# Patient Record
Sex: MALE | Race: WHITE | NOT HISPANIC OR LATINO | Employment: OTHER | URBAN - METROPOLITAN AREA
[De-identification: names, ages, dates, MRNs, and addresses within clinical notes are randomized per-mention and may not be internally consistent; named-entity substitution may affect disease eponyms.]

---

## 2017-02-22 ENCOUNTER — TELEPHONE (OUTPATIENT)
Dept: CARDIOLOGY | Facility: CLINIC | Age: 62
End: 2017-02-22

## 2017-02-22 NOTE — TELEPHONE ENCOUNTER
02/22/17  11:08 AM  Eduin Pisano  1955    Home Phone [phone]     Mrs. Pisano calls about her . She states last night, his HR went up to 140 for about an hour then came back down to 80s before he went to bed. She cannot remember his BP, but states it was fine.     This morning, HR has been fluctuating up to 132. She states he has no chest pain. He is has some mild SOA and occasional lightheadedness. She states he takes no medications.     He was last seen by Dr. Lezama in 2015. At that time he was taking 12.5mg metoprolol BID.    Do you want to order any testing on this patient? You have appts available the week of 3/6. Nettie has openings tomorrow if patient needs to be seen.    Mary ALTMAN RN

## 2017-02-22 NOTE — TELEPHONE ENCOUNTER
So does he want to be seen in clinic?  If so go ahead and schedule him as below.   He can also start 12.5 mg twice a day metoprolol tartrate since he is not on it

## 2017-02-22 NOTE — TELEPHONE ENCOUNTER
02/22/17  2:37 PM  Ms. Pisano accepted an appt with Nettie Avalos tomorrow, 2/23 at 2:30. I also sent 25mg metoprolol tartrate and instructed her to have her  take 1/2 tablet twice a day. I advised that her  should only take the medication if SBP >100mmHG and HR >60.    Mary ALTMAN RN

## 2017-02-22 NOTE — TELEPHONE ENCOUNTER
02/22/17  2:05 PM    I tried to call Mr. Pisano to speak with him; there was no answer. I called his wife; she asked me not to call Mr. Pisano's phone as he does not feel well. She states his HR is still fluctuating; his most recent HR 67.     She states his BP was normal for him last night. She will have him check it again when she gets home from work.    I inquired about the metoprolol. Mr. Pisano is not taking any metoprolol. His wife says he was told to stop taking it after his nuclear stress test on 5/14/15.     Mary ALTMAN RN

## 2017-02-22 NOTE — TELEPHONE ENCOUNTER
He can increase his metoprolol tartrate 25 mg twice a day.  He needs to make sure his systolic is around 100 mmHg or greater first.  If his heart rate continues to do this then we can have him seen later this week by Nettie.  If it calms down he can wait to see me the week of 3/6

## 2017-02-23 ENCOUNTER — OFFICE VISIT (OUTPATIENT)
Dept: CARDIOLOGY | Facility: CLINIC | Age: 62
End: 2017-02-23

## 2017-02-23 VITALS
BODY MASS INDEX: 28.86 KG/M2 | WEIGHT: 237 LBS | DIASTOLIC BLOOD PRESSURE: 68 MMHG | HEART RATE: 61 BPM | HEIGHT: 76 IN | SYSTOLIC BLOOD PRESSURE: 110 MMHG

## 2017-02-23 DIAGNOSIS — E78.2 MIXED HYPERLIPIDEMIA: ICD-10-CM

## 2017-02-23 DIAGNOSIS — R00.0 RAPID HEART RATE: ICD-10-CM

## 2017-02-23 DIAGNOSIS — R07.2 PRECORDIAL PAIN: Primary | ICD-10-CM

## 2017-02-23 PROCEDURE — 93000 ELECTROCARDIOGRAM COMPLETE: CPT | Performed by: NURSE PRACTITIONER

## 2017-02-23 PROCEDURE — 99214 OFFICE O/P EST MOD 30 MIN: CPT | Performed by: NURSE PRACTITIONER

## 2017-02-23 RX ORDER — ESCITALOPRAM OXALATE 20 MG/1
20 TABLET ORAL DAILY
COMMUNITY
End: 2021-03-15 | Stop reason: ALTCHOICE

## 2017-02-23 RX ORDER — SIMVASTATIN 40 MG
40 TABLET ORAL NIGHTLY
COMMUNITY
End: 2021-03-15 | Stop reason: ALTCHOICE

## 2017-02-23 RX ORDER — GABAPENTIN 300 MG/1
300 CAPSULE ORAL DAILY
COMMUNITY
End: 2021-03-15 | Stop reason: ALTCHOICE

## 2017-02-23 RX ORDER — ALBUTEROL SULFATE 90 UG/1
2 AEROSOL, METERED RESPIRATORY (INHALATION) EVERY 4 HOURS PRN
COMMUNITY

## 2017-02-23 RX ORDER — PANTOPRAZOLE SODIUM 40 MG/1
40 TABLET, DELAYED RELEASE ORAL 2 TIMES DAILY
COMMUNITY

## 2017-02-23 RX ORDER — BUSPIRONE HYDROCHLORIDE 10 MG/1
10 TABLET ORAL 2 TIMES DAILY
COMMUNITY
End: 2021-03-15 | Stop reason: ALTCHOICE

## 2017-02-23 NOTE — PROGRESS NOTES
Date of Office Visit: 2017  Encounter Provider: DESMOND Burris  Place of Service: [facility] CARDIOLOGY  Patient Name: Eduin Pisano  :1955    Chief Complaint   Patient presents with   • Rapid Heart Rate   :     HPI: Eduin Pisano is a 62 y.o. male comes in today for fluctuations of heart rate.  He is a patient of Dr. Lezama and I'm seeing him for the first time today.  He has a history of perioperative atrial fibrillation, dyslipidemia and COPD.  He was seen by Dr. Lezama in 2015 for preoperative evaluation for hip surgery.  He had a evaluation previously in  with normal testing.  Dr. Lezama did not feel that he needed any other testing.  He did start him on a low-dose beta blocker of 12.5 mg twice a day of metoprolol tartrate.  He followed up after his hip surgery and was complaining of some dull chest pain.  He sent him up for a nuclear stress test that was normal.  He did not have follow-up after that.    He called this week complaining of fluctuations of his heart rate.  He had one episode where he was walking out of a store and developed a rapid heart rate.  He got home and they checked his heart rate with a oxygen saturation monitor.  His heart rate was in the 140s, bouncing to the 130s.  He had some dizziness associated with this.  He also had some diaphoresis.  He relates out for about 30 minutes and this improved.  He had stopped the metoprolol 12.5 mg twice a day after his surgery a year ago.  They restarted this after his heart rate and he has not had anymore problems.  He also explains that he has had an adjustment to his CPAP in the past week and he's had some chest pain while wearing this.  Upon assessment, he reports that he also has chest pain after climbing stairs or exerting himself that lasts for approximately 2 hours.  He does not become nauseated or vomits with this.  He does not become diaphoretic with this.  He just has this pain  "that is midsternal and feels like pressure.    Past Medical History   Diagnosis Date   • Arthritis    • Esophageal reflux    • Prostate disorder        Past Surgical History   Procedure Laterality Date   • Hernia repair     • Prostate surgery     • Rotator cuff repair             Review of Systems   Constitution: Positive for malaise/fatigue. Negative for fever.   HENT: Negative for ear pain, hearing loss, nosebleeds and sore throat.    Eyes: Negative for double vision, pain, vision loss in left eye, vision loss in right eye and visual disturbance.   Cardiovascular: Positive for chest pain, dyspnea on exertion and palpitations. Negative for claudication and leg swelling.   Respiratory: Negative for cough, snoring and wheezing.    Endocrine: Negative for cold intolerance, heat intolerance and polyuria.   Skin: Negative for color change, itching and rash.   Musculoskeletal: Negative for joint pain, joint swelling and muscle cramps.   Gastrointestinal: Negative for abdominal pain, diarrhea, melena, nausea and vomiting.   Genitourinary: Negative for bladder incontinence and hematuria.   Neurological: Positive for excessive daytime sleepiness. Negative for dizziness, light-headedness, paresthesias and seizures.   Psychiatric/Behavioral: Positive for depression. The patient is nervous/anxious.    All other systems reviewed and are negative.    All other systems reviewed and are negative    No Known Allergies    All aspects of family and social history reviewed.          Objective:     Vitals:    02/23/17 1448   BP: 110/68   BP Location: Left arm   Pulse: 61   Weight: 237 lb (108 kg)   Height: 76\" (193 cm)     Body mass index is 28.85 kg/(m^2).    PHYSICAL EXAM:  Physical Exam   Constitutional: He is oriented to person, place, and time. He appears well-developed and well-nourished.   HENT:   Head: Normocephalic and atraumatic.   Neck: Neck supple. No JVD present.   Cardiovascular: Normal rate, regular rhythm, normal heart " sounds and intact distal pulses.    Pulses:       Carotid pulses are 2+ on the right side, and 2+ on the left side.       Radial pulses are 2+ on the right side, and 2+ on the left side.        Dorsalis pedis pulses are 2+ on the right side, and 2+ on the left side.   Pulmonary/Chest: Effort normal and breath sounds normal. No accessory muscle usage. No respiratory distress. He has no rales.   Abdominal: Soft. Normal appearance and bowel sounds are normal. There is no tenderness.   Musculoskeletal: Normal range of motion. He exhibits no edema.   Neurological: He is alert and oriented to person, place, and time.   Skin: Skin is warm, dry and intact. He is not diaphoretic.   Psychiatric: He has a normal mood and affect. His speech is normal and behavior is normal. Judgment and thought content normal. Cognition and memory are normal.         ECG 12 Lead  Date/Time: 2/23/2017 3:19 PM  Performed by: KHADIJAH CHERRY  Authorized by: KHADIJAH CHERRY   Comparison: compared with previous ECG from 5/8/2015  Similar to previous ECG  Rhythm: sinus rhythm  Rate: normal  BPM: 61  Conduction: right bundle branch block  ST Segments: ST segments normal  T Waves: T waves normal  QRS axis: normal  Other: no other findings  Clinical impression: abnormal ECG  Comments: Indication: chest pain/rapid heart rate                Assessment:       Diagnosis Plan   1. Precordial pain  Stress Test With Myocardial Perfusion One Day   2. Mixed hyperlipidemia  Stress Test With Myocardial Perfusion One Day   3. Rapid heart rate  Stress Test With Myocardial Perfusion One Day        Orders Placed This Encounter   Procedures   • Stress Test With Myocardial Perfusion One Day     Standing Status:   Future     Standing Expiration Date:   2/23/2018     Order Specific Question:   Rest/stress, rest only or stress only?     Answer:   Rest/Stress     Order Specific Question:   What stress agent will be used?     Answer:   Exercise with possible  pharmacologic     Order Specific Question:   Reason for exam?     Answer:   Chest Pain     Order Specific Question:   Chest pain specification?     Answer:   Suspected CAD       Current Outpatient Prescriptions   Medication Sig Dispense Refill   • albuterol (PROVENTIL HFA;VENTOLIN HFA) 108 (90 BASE) MCG/ACT inhaler Inhale 2 puffs Every 4 (Four) Hours As Needed for wheezing.     • busPIRone (BUSPAR) 10 MG tablet Take 10 mg by mouth 2 (Two) Times a Day.     • escitalopram (LEXAPRO) 20 MG tablet Take 20 mg by mouth Daily.     • gabapentin (NEURONTIN) 300 MG capsule Take 300 mg by mouth Daily.     • metoprolol tartrate (LOPRESSOR) 25 MG tablet Take 0.5 tablets by mouth 2 (Two) Times a Day. 90 tablet 0   • Multiple Vitamin (MULTI VITAMIN PO) Take  by mouth Daily.     • pantoprazole (PROTONIX) 40 MG EC tablet Take 40 mg by mouth 2 (Two) Times a Day.     • simvastatin (ZOCOR) 40 MG tablet Take 40 mg by mouth Every Night.     • tiotropium (SPIRIVA) 18 MCG per inhalation capsule Place 1 capsule into inhaler and inhale Daily.       No current facility-administered medications for this visit.             Plan:       #1 chest pain-patient 62-year-old with risk factors for coronary disease including family history and hyperlipidemia.  His wife also reports that he is borderline diabetic.  His chest pain takes a couple hours to resolved after exertion.  This may related to his COPD but could also angina.  I did discuss with Dr. Lezama and we will order a nuclear stress test.  This may be walking that patient may be unable to successfully perform this.  I did instruct him seen and to hold Toprol before the stress test.    #2 rapid heart rate-patient had one episode of rapid heart rate.  He does have a history of perioperative atrial fibrillation but no other accounts of this.  He has restarted his metoprolol 12.5 mg twice a day.  We will wait for another episode before placing a monitor.        Follow up in office in 3 months and  less has other episodes of palpitations.  We'll follow-up with patient after stress test.    As always, it has been a pleasure to participate in this patient's care.      Sincerely,      DESMOND Burris

## 2017-03-03 ENCOUNTER — HOSPITAL ENCOUNTER (OUTPATIENT)
Dept: CARDIOLOGY | Facility: HOSPITAL | Age: 62
Discharge: HOME | End: 2017-03-03
Admitting: NURSE PRACTITIONER

## 2017-03-03 DIAGNOSIS — R00.0 RAPID HEART RATE: ICD-10-CM

## 2017-03-03 DIAGNOSIS — E78.2 MIXED HYPERLIPIDEMIA: ICD-10-CM

## 2017-03-03 DIAGNOSIS — R07.2 PRECORDIAL PAIN: ICD-10-CM

## 2017-03-03 LAB
BH CV NUCLEAR PRIOR STUDY: 3
BH CV STRESS BP STAGE 1: NORMAL
BH CV STRESS COMMENTS STAGE 1: NORMAL
BH CV STRESS DOSE REGADENOSON STAGE 1: 0.4
BH CV STRESS DURATION MIN STAGE 1: 0
BH CV STRESS DURATION SEC STAGE 1: 15
BH CV STRESS HR STAGE 1: 76
BH CV STRESS PROTOCOL 1: NORMAL
BH CV STRESS RECOVERY BP: NORMAL MMHG
BH CV STRESS RECOVERY HR: 72 BPM
BH CV STRESS STAGE 1: 1
LV EF NUC BP: 72 %
MAXIMAL PREDICTED HEART RATE: 157 BPM
PERCENT MAX PREDICTED HR: 48.41 %
STRESS BASELINE BP: NORMAL MMHG
STRESS BASELINE HR: 55 BPM
STRESS PERCENT HR: 57 %
STRESS POST EXERCISE DUR SEC: 15 SEC
STRESS POST PEAK BP: NORMAL MMHG
STRESS POST PEAK HR: 76 BPM
STRESS TARGET HR: 133 BPM

## 2017-03-03 PROCEDURE — A9502 TC99M TETROFOSMIN: HCPCS | Performed by: NURSE PRACTITIONER

## 2017-03-03 PROCEDURE — 78452 HT MUSCLE IMAGE SPECT MULT: CPT

## 2017-03-03 PROCEDURE — 25010000002 REGADENOSON 0.4 MG/5ML SOLUTION: Performed by: NURSE PRACTITIONER

## 2017-03-03 PROCEDURE — 93016 CV STRESS TEST SUPVJ ONLY: CPT | Performed by: INTERNAL MEDICINE

## 2017-03-03 PROCEDURE — 93018 CV STRESS TEST I&R ONLY: CPT | Performed by: INTERNAL MEDICINE

## 2017-03-03 PROCEDURE — 78452 HT MUSCLE IMAGE SPECT MULT: CPT | Performed by: INTERNAL MEDICINE

## 2017-03-03 PROCEDURE — 93017 CV STRESS TEST TRACING ONLY: CPT

## 2017-03-03 PROCEDURE — 0 TECHNETIUM TETROFOSMIN KIT: Performed by: NURSE PRACTITIONER

## 2017-03-03 RX ADMIN — TETROFOSMIN 1 DOSE: 1.38 INJECTION, POWDER, LYOPHILIZED, FOR SOLUTION INTRAVENOUS at 12:34

## 2017-03-03 RX ADMIN — TETROFOSMIN 1 DOSE: 1.38 INJECTION, POWDER, LYOPHILIZED, FOR SOLUTION INTRAVENOUS at 11:30

## 2017-03-03 RX ADMIN — REGADENOSON 0.4 MG: 0.08 INJECTION, SOLUTION INTRAVENOUS at 12:35

## 2017-03-07 ENCOUNTER — TELEPHONE (OUTPATIENT)
Dept: CARDIOLOGY | Facility: CLINIC | Age: 62
End: 2017-03-07

## 2017-03-07 NOTE — TELEPHONE ENCOUNTER
03/07/17  10:42 AM  Eduin DE JESUS Norris  1955    Home Phone [phone]     Vivian Pisano - wife - [phone]. On release of information form.    Mrs. Pisano calls for the results of her 's stress test performed 3/3/17.    Mary ALTMAN RN

## 2017-03-08 NOTE — TELEPHONE ENCOUNTER
Pt's wife also call about these results, today. She also mentioned that his HR was getting in the high 40's to 50's with the metoprolol and was concerned as well. They would like called back with the results of his stress test at #[phone].  Thanks,  Jany

## 2017-03-09 ENCOUNTER — TELEPHONE (OUTPATIENT)
Dept: CARDIOLOGY | Facility: CLINIC | Age: 62
End: 2017-03-09

## 2017-03-09 NOTE — TELEPHONE ENCOUNTER
03/09/17  1:11 PM  Wife calls for results again. She's on release of information form.    Vivian [phone].    Mary ALTMAN RN

## 2017-03-09 NOTE — TELEPHONE ENCOUNTER
Stress test normal. No changes at this time. When I asked pt if still having chest pain, he referred me to discuss with his wife.     Ms. Pisano states that the patient still has dull ache in his chest in the center of this chest. He does have GERD. I have asked him to f/u with his PCP. He does drink 4-5 cups of caffeine per day, plus soft drinks.    Advised to call if pt does not improve or feels he needs to be seen before May

## 2017-05-26 ENCOUNTER — OFFICE VISIT (OUTPATIENT)
Dept: CARDIOLOGY | Facility: CLINIC | Age: 62
End: 2017-05-26

## 2017-05-26 VITALS
WEIGHT: 242 LBS | BODY MASS INDEX: 29.47 KG/M2 | HEART RATE: 54 BPM | DIASTOLIC BLOOD PRESSURE: 78 MMHG | HEIGHT: 76 IN | SYSTOLIC BLOOD PRESSURE: 110 MMHG

## 2017-05-26 DIAGNOSIS — E78.2 MIXED HYPERLIPIDEMIA: Primary | ICD-10-CM

## 2017-05-26 DIAGNOSIS — R00.2 PALPITATIONS: ICD-10-CM

## 2017-05-26 PROCEDURE — 99214 OFFICE O/P EST MOD 30 MIN: CPT | Performed by: INTERNAL MEDICINE

## 2017-05-26 PROCEDURE — 93000 ELECTROCARDIOGRAM COMPLETE: CPT | Performed by: INTERNAL MEDICINE

## 2017-06-01 ENCOUNTER — TELEPHONE (OUTPATIENT)
Dept: CARDIOLOGY | Facility: CLINIC | Age: 62
End: 2017-06-01

## 2017-11-17 ENCOUNTER — OFFICE VISIT (OUTPATIENT)
Dept: CARDIOLOGY | Facility: CLINIC | Age: 62
End: 2017-11-17

## 2017-11-17 VITALS
DIASTOLIC BLOOD PRESSURE: 72 MMHG | BODY MASS INDEX: 29.96 KG/M2 | WEIGHT: 246 LBS | HEART RATE: 58 BPM | SYSTOLIC BLOOD PRESSURE: 122 MMHG | HEIGHT: 76 IN

## 2017-11-17 DIAGNOSIS — R07.2 PRECORDIAL PAIN: ICD-10-CM

## 2017-11-17 DIAGNOSIS — I49.3 PVC (PREMATURE VENTRICULAR CONTRACTION): Primary | ICD-10-CM

## 2017-11-17 PROCEDURE — 99213 OFFICE O/P EST LOW 20 MIN: CPT | Performed by: INTERNAL MEDICINE

## 2017-11-17 PROCEDURE — 93000 ELECTROCARDIOGRAM COMPLETE: CPT | Performed by: INTERNAL MEDICINE

## 2017-11-17 NOTE — PROGRESS NOTES
Eduin Pisano  1955  Date of Office Visit:11/17/17  Encounter Provider: Santo Lezama MD  Place of Service: [facility] CARDIOLOGY      CHIEF COMPLAINT:  Palpitations  Hyperlipidemia  Right bundle branch block    HISTORY OF PRESENT ILLNESS: The patient is a very pleasant 62-year old gentleman with a medical history as documented above who presents back for a followup.  He has dyslipidemia and COPD.  He previously had an issue with perioperative atrial fibrillation which has subsequently resolved.  He was seen by me preoperatively for a hip surgery and actually did very well with the hip surgery.  Afterwards he did have a dull central chest discomfort that did not appear exertional, but was frequent.  He was sent for a Myocardial perfusion stress test which he underwent on 03/03/2017.  He had an apical defect that was fixed, but actually more pronounced with rest than with stress.  This was in the setting of normal wall motion and felt to be artifact.  His left ventricular function was hyperdynamic.  He has not had additional episodes of chest discomfort since that time.      On our last visit secondary to his complaints of palpitations, he did undergo a Holter monitor. He wore this for 48 hours. This was noted to have occasional PVCs and PACs. He had no SVT or atrial fibrillation. It was recommended that he actually continue his beta blocker at the current dose. No alterations were made.     He has been doing well since our last visit. The only complaint at this point in time is mild dyspnea on exertion that he states is unchanged. He feels this is secondary to his COPD. He denies any lower extremity edema or orthopnea. No PND. No chest pain. He states that he has very infrequent palpitations at this point in time. He notices these really only once a week. They are short in duration, lasting 10 seconds and go away on their own. No other associated symptoms.         Review  of Systems   Constitution: Negative for fever, weakness and malaise/fatigue.   HENT: Negative for nosebleeds and sore throat.    Eyes: Negative for blurred vision and double vision.   Cardiovascular: Negative for chest pain, claudication, palpitations and syncope.   Respiratory: Positive for shortness of breath. Negative for cough and snoring.    Endocrine: Negative for cold intolerance, heat intolerance and polydipsia.   Skin: Negative for itching, poor wound healing and rash.   Musculoskeletal: Negative for joint pain, joint swelling, muscle weakness and myalgias.   Gastrointestinal: Negative for abdominal pain, melena, nausea and vomiting.   Neurological: Positive for light-headedness. Negative for loss of balance, seizures and vertigo.   Psychiatric/Behavioral: Negative for altered mental status and depression.          Past Medical History:   Diagnosis Date   • Arthritis    • Chest discomfort    • Dyspnea on exertion    • Esophageal reflux    • Mixed hyperlipidemia    • Palpitation    • Prostate disorder    • Right bundle branch block        The following portions of the patient's history were reviewed and updated as appropriate: Social history , Family history and Surgical history     Current Outpatient Prescriptions on File Prior to Visit   Medication Sig Dispense Refill   • albuterol (PROVENTIL HFA;VENTOLIN HFA) 108 (90 BASE) MCG/ACT inhaler Inhale 2 puffs Every 4 (Four) Hours As Needed for wheezing.     • busPIRone (BUSPAR) 10 MG tablet Take 10 mg by mouth 2 (Two) Times a Day.     • escitalopram (LEXAPRO) 20 MG tablet Take 20 mg by mouth Daily.     • gabapentin (NEURONTIN) 300 MG capsule Take 300 mg by mouth Daily.     • metoprolol tartrate (LOPRESSOR) 25 MG tablet TAKE 1/2 TABLET BY MOUTH TWICE DAILY 90 tablet 0   • Multiple Vitamin (MULTI VITAMIN PO) Take  by mouth Daily.     • pantoprazole (PROTONIX) 40 MG EC tablet Take 40 mg by mouth 2 (Two) Times a Day.     • simvastatin (ZOCOR) 40 MG tablet Take 40  "mg by mouth Every Night.     • tiotropium (SPIRIVA) 18 MCG per inhalation capsule Place 1 capsule into inhaler and inhale Daily.       No current facility-administered medications on file prior to visit.        Allergies   Allergen Reactions   • Hydrocodone-Acetaminophen        Vitals:    11/17/17 1503   BP: 122/72   Pulse: 58   Weight: 246 lb (112 kg)   Height: 76\" (193 cm)     Physical Exam   Constitutional: He is oriented to person, place, and time. He appears well-developed and well-nourished.   HENT:   Head: Normocephalic and atraumatic.   Eyes: Conjunctivae and EOM are normal. No scleral icterus.   Neck: Normal range of motion. Neck supple. Normal carotid pulses, no hepatojugular reflux and no JVD present. Carotid bruit is not present. No tracheal deviation present. No thyromegaly present.   Cardiovascular: Normal rate and regular rhythm.  Exam reveals no gallop and no friction rub.    No murmur heard.  Pulses:       Carotid pulses are 2+ on the right side, and 2+ on the left side.       Radial pulses are 2+ on the right side, and 2+ on the left side.        Femoral pulses are 2+ on the right side, and 2+ on the left side.       Dorsalis pedis pulses are 2+ on the right side, and 2+ on the left side.        Posterior tibial pulses are 2+ on the right side, and 2+ on the left side.   Pulmonary/Chest: Breath sounds normal. No respiratory distress. He has no decreased breath sounds. He has no wheezes. He has no rhonchi. He has no rales. He exhibits no tenderness.   Abdominal: Soft. Bowel sounds are normal. He exhibits no distension. There is no tenderness. There is no rebound.   Musculoskeletal: He exhibits no edema or deformity.   Neurological: He is alert and oriented to person, place, and time. He has normal strength. No sensory deficit.   Skin: No rash noted. No erythema.   Psychiatric: He has a normal mood and affect. His behavior is normal.       No components found for: CBC  No results found for: CMP  No " components found for: LIPID  No results found for: BMP      ECG 12 Lead  Date/Time: 11/17/2017 3:35 PM  Performed by: DA BARRERA  Authorized by: DA BARRERA   Comparison: compared with previous ECG from 5/26/2017  Similar to previous ECG  Rhythm: sinus rhythm  Rate: normal  Conduction: right bundle branch block  ST Segments: ST segments normal  T Waves: T waves normal  QRS axis: normal  Clinical impression: abnormal ECG               3/3/2017  · There is a small fixed apical defect that is more pronounced at rest than with stress. There is normal apical wall motion. This is likely normal. No significant ischemia or infarct is noted.  · Left ventricular ejection fraction is hyperdynamic (Calculated EF > 70%).    DISCUSSION/SUMMARY This is a very pleasant 62-year-old gentleman with a medical history as documented above, including right bundle branch block, chest discomfort with a negative stress test, along with palpitations present back for follow-up.  He has been doing very well since our last visit.  He denies any angina or dyspnea on exertion.  His palpitations are infrequent.      Palpitations and intermittent tachycardia.  48 hour Holter monitor would just occasional PVCs and PACs.  Infrequent.  Continue current beta blockade dose.    Chest pain: Resolved.  Negative stress test prior.    I told him that we don't have to see him back on a scheduled basis and will be willing to see him as needed.  He is agreeable to that plan.

## 2021-03-15 ENCOUNTER — OFFICE VISIT (OUTPATIENT)
Dept: CARDIOLOGY | Facility: CLINIC | Age: 66
End: 2021-03-15

## 2021-03-15 VITALS
HEART RATE: 80 BPM | SYSTOLIC BLOOD PRESSURE: 110 MMHG | BODY MASS INDEX: 30.32 KG/M2 | WEIGHT: 249 LBS | HEIGHT: 76 IN | DIASTOLIC BLOOD PRESSURE: 68 MMHG

## 2021-03-15 DIAGNOSIS — E78.2 MIXED HYPERLIPIDEMIA: Primary | ICD-10-CM

## 2021-03-15 DIAGNOSIS — G50.0 TRIGEMINAL NEURALGIA: Primary | ICD-10-CM

## 2021-03-15 DIAGNOSIS — R00.1 BRADYCARDIA, SINUS: ICD-10-CM

## 2021-03-15 PROCEDURE — 93000 ELECTROCARDIOGRAM COMPLETE: CPT | Performed by: INTERNAL MEDICINE

## 2021-03-15 PROCEDURE — 99204 OFFICE O/P NEW MOD 45 MIN: CPT | Performed by: INTERNAL MEDICINE

## 2021-03-15 RX ORDER — TIOTROPIUM BROMIDE AND OLODATEROL 3.124; 2.736 UG/1; UG/1
2 SPRAY, METERED RESPIRATORY (INHALATION) DAILY
COMMUNITY
Start: 2021-03-11

## 2021-03-15 RX ORDER — ATORVASTATIN CALCIUM 10 MG/1
10 TABLET, FILM COATED ORAL
COMMUNITY
Start: 2021-02-17

## 2021-03-15 RX ORDER — LORATADINE 10 MG/1
10 CAPSULE, LIQUID FILLED ORAL DAILY
COMMUNITY

## 2021-03-15 RX ORDER — ESCITALOPRAM OXALATE 10 MG/1
15 TABLET ORAL DAILY
COMMUNITY
Start: 2021-01-27

## 2021-03-15 RX ORDER — BUSPIRONE HYDROCHLORIDE 30 MG/1
30 TABLET ORAL 2 TIMES DAILY
COMMUNITY
Start: 2021-02-15

## 2021-03-15 NOTE — PROGRESS NOTES
Eduin Pisano  1955  Date of Office Visit: 03/15/21  Encounter Provider: Santo Lezama MD  Place of Service: [facility] CARDIOLOGY      CHIEF COMPLAINT:   Irregular heart rhythm  Hyperlipidemia      HISTORY OF PRESENT ILLNESS:   A 66-year-old male who I previously evaluated in 2017 for palpitations, who presents back secondary to the finding of irregularity in heart rate documented on pulse oximetry.  The patient states that on his pulse oximetry he notices his heart rate will go anywhere from 37 to 80 to 61 and it does not make any sense to him.  He is very concerned about that.  He also reports that he has some palpitations that tend to last about 20 minutes in duration and then go away on their own.  They are not associated with chest pain or dyspnea.  No orthopnea or PND.            Review of Systems   Constitutional: Negative for fever and malaise/fatigue.   HENT: Negative for nosebleeds and sore throat.    Eyes: Negative for blurred vision and double vision.   Cardiovascular: Negative for chest pain, claudication, palpitations and syncope.   Respiratory: Negative for cough, shortness of breath and snoring.    Endocrine: Negative for cold intolerance, heat intolerance and polydipsia.   Skin: Negative for itching, poor wound healing and rash.   Musculoskeletal: Negative for joint pain, joint swelling, muscle weakness and myalgias.   Gastrointestinal: Negative for abdominal pain, melena, nausea and vomiting.   Neurological: Negative for light-headedness, loss of balance, seizures, vertigo and weakness.   Psychiatric/Behavioral: Negative for altered mental status and depression.         Past Medical History:   Diagnosis Date   • Arthritis    • Chest discomfort    • COPD (chronic obstructive pulmonary disease) (CMS/HCC)    • Dyspnea on exertion    • Esophageal reflux    • Hypertension    • Mixed hyperlipidemia    • Palpitation    • Prostate cancer (CMS/[facility])    • Prostate  "disorder    • Right bundle branch block        The following portions of the patient's history were reviewed and updated as appropriate: Social history , Family history and Surgical history     Current Outpatient Medications on File Prior to Visit   Medication Sig Dispense Refill   • albuterol (PROVENTIL HFA;VENTOLIN HFA) 108 (90 BASE) MCG/ACT inhaler Inhale 2 puffs Every 4 (Four) Hours As Needed for wheezing.     • atorvastatin (LIPITOR) 10 MG tablet Take 10 mg by mouth every night at bedtime.     • busPIRone (BUSPAR) 30 MG tablet Take 30 mg by mouth 2 (Two) Times a Day.     • escitalopram (LEXAPRO) 10 MG tablet Take 15 mg by mouth Daily.     • Loratadine 10 MG capsule Take 10 mg by mouth Daily.     • metoprolol tartrate (LOPRESSOR) 25 MG tablet TAKE 1/2 TABLET BY MOUTH TWICE DAILY 90 tablet 0   • Multiple Vitamin (MULTI VITAMIN PO) Take  by mouth Daily.     • pantoprazole (PROTONIX) 40 MG EC tablet Take 40 mg by mouth 2 (Two) Times a Day.     • Stiolto Respimat 2.5-2.5 MCG/ACT aerosol solution inhaler Inhale 2 puffs Daily.     • [DISCONTINUED] busPIRone (BUSPAR) 10 MG tablet Take 10 mg by mouth 2 (Two) Times a Day.     • [DISCONTINUED] escitalopram (LEXAPRO) 20 MG tablet Take 20 mg by mouth Daily.     • [DISCONTINUED] gabapentin (NEURONTIN) 300 MG capsule Take 300 mg by mouth Daily.     • [DISCONTINUED] simvastatin (ZOCOR) 40 MG tablet Take 40 mg by mouth Every Night.     • [DISCONTINUED] tiotropium (SPIRIVA) 18 MCG per inhalation capsule Place 1 capsule into inhaler and inhale Daily.       No current facility-administered medications on file prior to visit.       No Known Allergies    Vitals:    03/15/21 1457   BP: 110/68   Pulse: 80   Weight: 113 kg (249 lb)   Height: 193 cm (76\")     Body mass index is 30.31 kg/m².   Constitutional:       Appearance: Well-developed.   Eyes:      General: No scleral icterus.     Conjunctiva/sclera: Conjunctivae normal.   HENT:      Head: Normocephalic and atraumatic.   Neck:     "  Thyroid: No thyromegaly.      Vascular: Normal carotid pulses. No carotid bruit, hepatojugular reflux or JVD.      Trachea: No tracheal deviation.   Pulmonary:      Effort: No respiratory distress.      Breath sounds: Normal breath sounds. No decreased breath sounds. No wheezing. No rhonchi. No rales.   Chest:      Chest wall: Not tender to palpatation.   Cardiovascular:      Normal rate. Regular rhythm.      No gallop.   Pulses:     Carotid: 2+ bilaterally.     Radial: 2+ bilaterally.     Femoral: 2+ bilaterally.     Dorsalis pedis: 2+ bilaterally.     Posterior tibial: 2+ bilaterally.  Edema:     Peripheral edema absent.   Abdominal:      General: Bowel sounds are normal. There is no distension.      Palpations: Abdomen is soft.      Tenderness: There is no abdominal tenderness. There is no rebound.   Musculoskeletal:         General: No deformity.      Cervical back: Normal range of motion and neck supple. Skin:     Findings: No erythema or rash.   Neurological:      Mental Status: Alert and oriented to person, place, and time.      Sensory: No sensory deficit.   Psychiatric:         Behavior: Behavior normal.            Lab Results   Component Value Date    WBC 3.33 (L) 12/10/2020    HGB 14.3 12/10/2020    HCT 43.1 12/10/2020    MCV 87.8 12/10/2020    PLT 90 (L) 12/10/2020       Lab Results   Component Value Date    BUN 14 12/10/2020    CREATININE 0.90 12/10/2020    BCR 15.6 12/10/2020    K 4.4 12/10/2020    CO2 26 12/10/2020    CALCIUM 8.9 12/10/2020    ALBUMIN 3.8 12/10/2020    LABIL2 1.4 12/10/2020    AST 43 12/10/2020    ALT 38 12/10/2020       Lab Results   Component Value Date    CALCIUM 8.9 12/10/2020     12/10/2020    K 4.4 12/10/2020    CO2 26 12/10/2020     12/10/2020    BUN 14 12/10/2020    CREATININE 0.90 12/10/2020    BCR 15.6 12/10/2020       No results found for: CHOL, CHLPL, TRIG, HDL, LDL, LDLDIRECT      ECG 12 Lead    Date/Time: 3/15/2021 3:27 PM  Performed by: Santo Lezama  MD MOISES  Authorized by: Santo Lezama MD   Comparison: compared with previous ECG from 11/17/2017  Similar to previous ECG  Rhythm: sinus rhythm  Conduction: right bundle branch block                  DISCUSSION/SUMMARYA very pleasant 66-year-old with a medical history as documented above including right bundle branch block, palpitations, PVCs and bigeminy previously documented on monitor, who presents to me secondary to variability in his heart rate.   He has recently had a transthoracic echocardiogram in mid-2020 with normal left ventricular size and systolic function and no significant valvular heart disease.  I do think it is more likely that his pulse oximeter is incorrectly measuring the pulse rate secondary to PVCs rather than him having such a degree of variability at rest in his heart rate.      1.  Palpitations/heart rate variability.  -A 48-hour Holter monitor will be ordered.  -Continue metoprolol therapy at current dose, however I will move to metoprolol succinate and keep him at a total dose of 25 mg daily.    2.  Hyperlipidemia:  Continue atorvastatin therapy.  Tolerating well.  No myalgias.  No elevation in transaminases.  I have reviewed the patient’s most recent lab work and things have looked great on the current dose.

## 2021-03-18 ENCOUNTER — TELEPHONE (OUTPATIENT)
Dept: CARDIOLOGY | Facility: CLINIC | Age: 66
End: 2021-03-18

## 2021-03-18 RX ORDER — METOPROLOL SUCCINATE 25 MG/1
25 TABLET, EXTENDED RELEASE ORAL DAILY
Qty: 90 TABLET | Refills: 3 | Status: SHIPPED | OUTPATIENT
Start: 2021-03-18 | End: 2022-03-04

## 2021-03-18 NOTE — TELEPHONE ENCOUNTER
Patient wife calling stating when Eduin was in on 3/15/21. You were going to switch him over to Metoprolol Succ 25mg once daily  It appears this wasn't called into his pharm.  Uses [facility]    [phone]- felipe   [phone]

## 2021-03-23 ENCOUNTER — TELEPHONE (OUTPATIENT)
Dept: CARDIOLOGY | Facility: CLINIC | Age: 66
End: 2021-03-23

## 2021-03-23 NOTE — TELEPHONE ENCOUNTER
Pt's wife called for the results of his Holter. I think you might have these strips with you. If not let me know and I will get them for you.  They can be reached at #[phone] or [phone].    Thanks,  Jany

## 2021-03-25 NOTE — TELEPHONE ENCOUNTER
Please let her know that the monitor is normal.  No evidence of atrial fibrillation or rhythm issues

## 2021-03-26 NOTE — TELEPHONE ENCOUNTER
Relayed message to pt's wife, she inquired about f/u appt so i scheduled 6 month f/u per Jany.    GARETH

## 2021-04-20 ENCOUNTER — TELEPHONE (OUTPATIENT)
Dept: CARDIOLOGY | Facility: CLINIC | Age: 66
End: 2021-04-20

## 2021-04-20 NOTE — TELEPHONE ENCOUNTER
Neuro Referral  Received: 2 weeks ago  Elaine Diaz Christopher A, MD  The office replied back:     Please let referring office know [facility] doesn't see new pt's in clinic, also I reviewed records and didn't see any mention of trigeminal neuralgia.       Please AdviseVelma        03/15/2021 07:15:05 PM Best Practice Alert Acknowledgment (Outgoing)     Mayelin Gilbert    Neurology pls review as ref is to [facility] brown and pt hasnt ever seen her       03/18/2021 02:10:08 PM In Basket (Outgoing)     Kuldeep Yang    Please let referring office know [facility] doesn't see new pt's in clinic, also I reviewed records and didn't see any mention of trigeminal neuralgia.       04/01/2021 02:49:30 PM In Basket (Outgoing)     Elaine Diaz    4/1 Ms to  with reply from neuro office

## 2021-04-20 NOTE — TELEPHONE ENCOUNTER
See below. Where would you like his Neurology referral to go to instead?    The pt is calling back about this because he hasn't heard back from us.    Please advise.    Thanks,  Jany

## 2021-04-20 NOTE — TELEPHONE ENCOUNTER
See below. Is there anyway this can be worked on a soon as possible since he has been waiting awhile.    Thanks,  Jany

## 2021-09-27 ENCOUNTER — OFFICE VISIT (OUTPATIENT)
Dept: CARDIOLOGY | Facility: CLINIC | Age: 66
End: 2021-09-27

## 2021-09-27 VITALS
HEIGHT: 76 IN | SYSTOLIC BLOOD PRESSURE: 131 MMHG | DIASTOLIC BLOOD PRESSURE: 70 MMHG | BODY MASS INDEX: 29.76 KG/M2 | OXYGEN SATURATION: 95 % | WEIGHT: 244.4 LBS | HEART RATE: 52 BPM

## 2021-09-27 DIAGNOSIS — R00.1 BRADYCARDIA, SINUS: ICD-10-CM

## 2021-09-27 DIAGNOSIS — E78.2 MIXED HYPERLIPIDEMIA: Primary | ICD-10-CM

## 2021-09-27 PROCEDURE — 93000 ELECTROCARDIOGRAM COMPLETE: CPT | Performed by: INTERNAL MEDICINE

## 2021-09-27 PROCEDURE — 99213 OFFICE O/P EST LOW 20 MIN: CPT | Performed by: INTERNAL MEDICINE

## 2021-09-27 RX ORDER — ROPINIROLE 0.5 MG/1
TABLET, FILM COATED ORAL
COMMUNITY
Start: 2021-08-13

## 2021-09-27 NOTE — PROGRESS NOTES
Eduin Pisano  1955  Date of Office Visit: 09/27/21  Encounter Provider: Santo Lezama MD  Place of Service: [facility] CARDIOLOGY      CHIEF COMPLAINT:   Premature atrial and ventricular complexes.  Hyperlipidemia      HISTORY OF PRESENT ILLNESS:   66-year-old male with a medical history of PACs, PVCs, hyperlipidemia, COPD who presents back to me in follow-up.  He states he has been doing well since our last visit.  He denies any chest pain or FISCHER.  He has not had recent issues with palpitations or tachycardia.  He previously has had palpitations and been evaluated with Holter monitor in 2017 and more recently in 2020.  He was noted to have rare PACs and PVCs documented on that.  He continues on low-dose Toprol therapy with a resting heart rate in the 50s but no lightheadedness or fatigue.      Review of Systems   Constitutional: Negative for fever and malaise/fatigue.   HENT: Negative for nosebleeds and sore throat.    Eyes: Negative for blurred vision and double vision.   Cardiovascular: Negative for chest pain, claudication, palpitations and syncope.   Respiratory: Negative for cough, shortness of breath and snoring.    Endocrine: Negative for cold intolerance, heat intolerance and polydipsia.   Skin: Negative for itching, poor wound healing and rash.   Musculoskeletal: Negative for joint pain, joint swelling, muscle weakness and myalgias.   Gastrointestinal: Negative for abdominal pain, melena, nausea and vomiting.   Neurological: Negative for light-headedness, loss of balance, seizures, vertigo and weakness.   Psychiatric/Behavioral: Negative for altered mental status and depression.         Past Medical History:   Diagnosis Date   • Arthritis    • Chest discomfort    • COPD (chronic obstructive pulmonary disease) (CMS/HCC)    • Dyspnea on exertion    • Esophageal reflux    • Hypertension    • Mixed hyperlipidemia    • Palpitation    • Prostate cancer (CMS/HCC)    •  "Prostate disorder    • Right bundle branch block        The following portions of the patient's history were reviewed and updated as appropriate: Social history , Family history and Surgical history     Current Outpatient Medications on File Prior to Visit   Medication Sig Dispense Refill   • albuterol (PROVENTIL HFA;VENTOLIN HFA) 108 (90 BASE) MCG/ACT inhaler Inhale 2 puffs Every 4 (Four) Hours As Needed for wheezing.     • atorvastatin (LIPITOR) 10 MG tablet Take 10 mg by mouth every night at bedtime.     • busPIRone (BUSPAR) 30 MG tablet Take 30 mg by mouth 2 (Two) Times a Day.     • escitalopram (LEXAPRO) 10 MG tablet Take 15 mg by mouth Daily.     • Loratadine 10 MG capsule Take 10 mg by mouth Daily.     • metoprolol succinate XL (TOPROL-XL) 25 MG 24 hr tablet Take 1 tablet by mouth Daily. 90 tablet 3   • Multiple Vitamin (MULTI VITAMIN PO) Take  by mouth Daily.     • pantoprazole (PROTONIX) 40 MG EC tablet Take 40 mg by mouth 2 (Two) Times a Day.     • rOPINIRole (REQUIP) 0.5 MG tablet TAKE 1 TABLET BY MOUTH DAILY AT SUPPER AND MAY REPEAT 1 TABLET 1 HOUR BEFORE BEDTIME AS DIRECTED     • Stiolto Respimat 2.5-2.5 MCG/ACT aerosol solution inhaler Inhale 2 puffs Daily.       No current facility-administered medications on file prior to visit.       No Known Allergies    Vitals:    09/27/21 1516   BP: 131/70   BP Location: Left arm   Pulse: 52   SpO2: 95%   Weight: 111 kg (244 lb 6.4 oz)   Height: 193 cm (76\")     Body mass index is 29.75 kg/m².   Constitutional:       Appearance: Well-developed.   Eyes:      General: No scleral icterus.     Conjunctiva/sclera: Conjunctivae normal.   HENT:      Head: Normocephalic and atraumatic.   Neck:      Thyroid: No thyromegaly.      Vascular: Normal carotid pulses. No carotid bruit, hepatojugular reflux or JVD.      Trachea: No tracheal deviation.   Pulmonary:      Effort: No respiratory distress.      Breath sounds: Normal breath sounds. No decreased breath sounds. No " wheezing. No rhonchi. No rales.   Chest:      Chest wall: Not tender to palpatation.   Cardiovascular:      Normal rate. Regular rhythm.      No gallop.   Pulses:     Carotid: 2+ bilaterally.     Radial: 2+ bilaterally.     Femoral: 2+ bilaterally.     Dorsalis pedis: 2+ bilaterally.     Posterior tibial: 2+ bilaterally.  Edema:     Peripheral edema absent.   Abdominal:      General: Bowel sounds are normal. There is no distension.      Palpations: Abdomen is soft.      Tenderness: There is no abdominal tenderness. There is no rebound.   Musculoskeletal:         General: No deformity.      Cervical back: Normal range of motion and neck supple. Skin:     Findings: No erythema or rash.   Neurological:      Mental Status: Alert and oriented to person, place, and time.      Sensory: No sensory deficit.   Psychiatric:         Behavior: Behavior normal.            Lab Results   Component Value Date    WBC 3.33 (L) 12/10/2020    HGB 14.3 12/10/2020    HCT 43.1 12/10/2020    MCV 87.8 12/10/2020    PLT 90 (L) 12/10/2020       Lab Results   Component Value Date    BUN 14 12/10/2020    CREATININE 0.90 12/10/2020    BCR 15.6 12/10/2020    K 4.4 12/10/2020    CO2 26 12/10/2020    CALCIUM 8.9 12/10/2020    ALBUMIN 3.8 12/10/2020    LABIL2 1.4 12/10/2020    AST 43 12/10/2020    ALT 38 12/10/2020       Lab Results   Component Value Date    CALCIUM 8.9 12/10/2020     12/10/2020    K 4.4 12/10/2020    CO2 26 12/10/2020     12/10/2020    BUN 14 12/10/2020    CREATININE 0.90 12/10/2020    BCR 15.6 12/10/2020       No results found for: CHOL, CHLPL, TRIG, HDL, LDL, LDLDIRECT      ECG 12 Lead    Date/Time: 9/27/2021 3:44 PM  Performed by: Santo Lezama MD  Authorized by: Santo Lezama MD   Comparison: compared with previous ECG from 3/15/2021  Similar to previous ECG  Rhythm: sinus rhythm  Rate: normal  Conduction: right bundle branch block                Sinus rhythm with occasional PVCs and PACs.  698  PVCs, accounting for 0.4% of the overall beats.  518 beats in ventricular bigeminy.  No SVT.  No atrial fibrillation      DISCUSSION/SUMMARY\  66-year-old with a medical history as documented above including right bundle branch block, palpitations, PVCs and bigeminy previously documented on monitor.   He has recently had a transthoracic echocardiogram in mid-2020 with normal left ventricular size and systolic function and no significant valvular heart disease.      1.  Palpitations/PVCs and PACs  -Overall he is doing well.  Denies any palpitations of concern since our last visit.  He continues on low-dose Toprol therapy.  Continue current dose.  -EKG today has been reviewed and does not have any issues with ectopy on my review.    2.  Hyperlipidemia:  Continue atorvastatin therapy.  Tolerating well.  No myalgias.  No elevation in transaminases.  Tolerating well.  Cholesterol reasonably controlled.    I will see him back in 1 year.

## 2022-03-04 RX ORDER — METOPROLOL SUCCINATE 25 MG/1
25 TABLET, EXTENDED RELEASE ORAL DAILY
Qty: 90 TABLET | Refills: 3 | Status: SHIPPED | OUTPATIENT
Start: 2022-03-04 | End: 2023-03-13

## 2023-03-13 RX ORDER — METOPROLOL SUCCINATE 25 MG/1
25 TABLET, EXTENDED RELEASE ORAL DAILY
Qty: 90 TABLET | Refills: 3 | Status: SHIPPED | OUTPATIENT
Start: 2023-03-13

## 2024-03-11 ENCOUNTER — TELEPHONE (OUTPATIENT)
Dept: CARDIOLOGY | Facility: CLINIC | Age: 69
End: 2024-03-11
Payer: MEDICARE

## 2024-03-11 RX ORDER — METOPROLOL SUCCINATE 25 MG/1
25 TABLET, EXTENDED RELEASE ORAL DAILY
Qty: 90 TABLET | Refills: 0 | Status: SHIPPED | OUTPATIENT
Start: 2024-03-11

## 2024-03-11 NOTE — TELEPHONE ENCOUNTER
03/11/2024      Called but was unable to get in contact with the patient due to nonworking number on file.     Thanks   Eleanor

## 2024-03-11 NOTE — TELEPHONE ENCOUNTER
Pt is way past due for a 1 year follow up with Dr. Lezama. When you get a chance can you call and get him setup with either Dr. Lezama or Mayelin Jimenez.    Thanks,  Jany

## 2024-06-12 RX ORDER — METOPROLOL SUCCINATE 25 MG/1
25 TABLET, EXTENDED RELEASE ORAL DAILY
Qty: 90 TABLET | Refills: 0 | OUTPATIENT
Start: 2024-06-12